# Patient Record
Sex: MALE | Race: WHITE | NOT HISPANIC OR LATINO
[De-identification: names, ages, dates, MRNs, and addresses within clinical notes are randomized per-mention and may not be internally consistent; named-entity substitution may affect disease eponyms.]

---

## 2020-11-30 PROBLEM — Z00.00 ENCOUNTER FOR PREVENTIVE HEALTH EXAMINATION: Status: ACTIVE | Noted: 2020-11-30

## 2020-12-07 ENCOUNTER — APPOINTMENT (OUTPATIENT)
Dept: COLORECTAL SURGERY | Facility: CLINIC | Age: 60
End: 2020-12-07
Payer: COMMERCIAL

## 2020-12-07 VITALS
WEIGHT: 181 LBS | HEIGHT: 68 IN | BODY MASS INDEX: 27.43 KG/M2 | HEART RATE: 108 BPM | DIASTOLIC BLOOD PRESSURE: 88 MMHG | SYSTOLIC BLOOD PRESSURE: 144 MMHG | TEMPERATURE: 97.9 F

## 2020-12-07 DIAGNOSIS — Z80.52 FAMILY HISTORY OF MALIGNANT NEOPLASM OF BLADDER: ICD-10-CM

## 2020-12-07 DIAGNOSIS — G47.30 SLEEP APNEA, UNSPECIFIED: ICD-10-CM

## 2020-12-07 DIAGNOSIS — Z72.89 OTHER PROBLEMS RELATED TO LIFESTYLE: ICD-10-CM

## 2020-12-07 DIAGNOSIS — E78.00 PURE HYPERCHOLESTEROLEMIA, UNSPECIFIED: ICD-10-CM

## 2020-12-07 DIAGNOSIS — Z82.49 FAMILY HISTORY OF ISCHEMIC HEART DISEASE AND OTHER DISEASES OF THE CIRCULATORY SYSTEM: ICD-10-CM

## 2020-12-07 DIAGNOSIS — Z83.3 FAMILY HISTORY OF DIABETES MELLITUS: ICD-10-CM

## 2020-12-07 DIAGNOSIS — M19.90 UNSPECIFIED OSTEOARTHRITIS, UNSPECIFIED SITE: ICD-10-CM

## 2020-12-07 DIAGNOSIS — Z80.3 FAMILY HISTORY OF MALIGNANT NEOPLASM OF BREAST: ICD-10-CM

## 2020-12-07 PROCEDURE — 99072 ADDL SUPL MATRL&STAF TM PHE: CPT

## 2020-12-07 PROCEDURE — 46600 DIAGNOSTIC ANOSCOPY SPX: CPT

## 2020-12-07 PROCEDURE — 99202 OFFICE O/P NEW SF 15 MIN: CPT | Mod: 25

## 2020-12-07 RX ORDER — LATANOPROST/PF 0.005 %
DROPS OPHTHALMIC (EYE)
Refills: 0 | Status: ACTIVE | COMMUNITY

## 2020-12-07 RX ORDER — CALCIUM POLYCARBOPHIL 625 MG
TABLET ORAL
Refills: 0 | Status: ACTIVE | COMMUNITY

## 2020-12-07 RX ORDER — MV-MIN/FOLIC/K1/LYCOPEN/LUTEIN 300-60 MCG
TABLET ORAL
Refills: 0 | Status: ACTIVE | COMMUNITY

## 2020-12-07 RX ORDER — OMEPRAZOLE 20 MG/1
20 CAPSULE, DELAYED RELEASE ORAL
Refills: 0 | Status: ACTIVE | COMMUNITY

## 2020-12-07 NOTE — PHYSICAL EXAM
[Excoriation] : no perianal excoriation [Normal] : was normal [None] : there was no rectal mass  [FreeTextEntry1] : A lighted anoscope was passed into the anal canal and the entire anal mucosal surface was inspected..  The findings revealed moderate internal hemorrhoids. No masses or lesions were identified.\par \par Large right posterior thrombosed internal hemorrhoid.

## 2020-12-07 NOTE — HISTORY OF PRESENT ILLNESS
[FreeTextEntry1] : 61 y/o M presents for evaluation of hemorrhoids, referred by Dr. Mark Vizcarra \par Patient underwent a hip replacement 7 week ago \par ~ 5 weeks ago he began to notice rectal pressure and possible prolapsing tissue \par D/c'd narcotics and prescription pain medications 4 weeks ago\par Also c/o left sided coccygeal pain for the last couple of weeks. Denies fall or injury to the area. Orthopedist evaluated patient and determined it was unrelated to recent surgery \par Previously evaluated at [facility] for rectal bleeding that occurred regardless of BM's. Denies noticing bleeding in the last 4 weeks\par Denies itching or imitation\par BH: Daily\par Denies constipation or diarrhea. Admits to continued straining with BM's that he attributes to needing to use a raised toilet seat but reports straining is improved\par Reports adequate dietary fiber and water intake. Drinking 8 glasses of water daily\par Taking FiberCon 3 tabs daily \par No use of stool softeners or laxatives. Initially used Colace PRN after surgery\par No sitz baths right now, unable to get in to tub with recent surgery\par Has tried Prep H ointment with mild to moderate improvement in symptoms\par Last colonoscopy completed 2018, no abnormal findings per patient\par Taking two ASA 81 mg 2/2 PE following surgery

## 2020-12-07 NOTE — ASSESSMENT
[FreeTextEntry1] : I reviewed with the patient I suspect that his anticoagulation has resulted in a propensity for an  internal hemorrhoid/thrombosis. I have counseled him regarding the need for continued efforts to avoid straining with bowel function. I anticipate spontaneous resolution of this process in 4-6 weeks. Advised to return if symptoms persist.

## 2021-03-11 ENCOUNTER — PREPPED CHART (OUTPATIENT)
Dept: URBAN - METROPOLITAN AREA CLINIC 94 | Facility: CLINIC | Age: 61
End: 2021-03-11

## 2021-12-22 ENCOUNTER — APPOINTMENT (OUTPATIENT)
Dept: GASTROENTEROLOGY | Facility: CLINIC | Age: 61
End: 2021-12-22
Payer: COMMERCIAL

## 2021-12-22 VITALS
OXYGEN SATURATION: 98 % | BODY MASS INDEX: 26.52 KG/M2 | HEIGHT: 68 IN | DIASTOLIC BLOOD PRESSURE: 90 MMHG | SYSTOLIC BLOOD PRESSURE: 156 MMHG | TEMPERATURE: 97.9 F | WEIGHT: 175 LBS | HEART RATE: 91 BPM

## 2021-12-22 DIAGNOSIS — Z99.89 OBSTRUCTIVE SLEEP APNEA (ADULT) (PEDIATRIC): ICD-10-CM

## 2021-12-22 DIAGNOSIS — Z86.39 PERSONAL HISTORY OF OTHER ENDOCRINE, NUTRITIONAL AND METABOLIC DISEASE: ICD-10-CM

## 2021-12-22 DIAGNOSIS — Z86.59 PERSONAL HISTORY OF OTHER MENTAL AND BEHAVIORAL DISORDERS: ICD-10-CM

## 2021-12-22 DIAGNOSIS — G47.33 OBSTRUCTIVE SLEEP APNEA (ADULT) (PEDIATRIC): ICD-10-CM

## 2021-12-22 PROCEDURE — 99203 OFFICE O/P NEW LOW 30 MIN: CPT

## 2021-12-22 RX ORDER — ASPIRIN 81 MG
81 TABLET, DELAYED RELEASE (ENTERIC COATED) ORAL
Refills: 0 | Status: DISCONTINUED | COMMUNITY
End: 2021-12-22

## 2021-12-22 NOTE — HISTORY OF PRESENT ILLNESS
[FreeTextEntry1] : 60 yo male with a pancreatic cyst here for evaluation, referred by Dr. Paresh Dodd.  MRCP showed a cyst in the uncinate of the pancreas measuring 2.1 cm.  Some tightness in his lower abdomen now, having multiple sludgy bm's -- also takes prune juice, simethicone and FiberCon.  Hx of anxiety, kept in check with meds that he is taking.  No N/V.  No constipation.  Since 2/1/21 drinking 1-2 16 oz green juices.  Rectal bleeding continues with blood in toilet and tissue -- believes that it is related to his hemorrhoids. Weight fluctuates in a 5 lb range, down 10-12 lbs from a year ago.  Little heartburn/reflux.  No hx of an EUS.  \par \par MRCP on 10/19/21 -- stable to slight increase in size of 2.1 x 1.4 x 1.3 cm uncinate process multiloculated cystic lesion in close proximity to the duct (on my review looks more in the head of the pancreas), demonstrates internal septa, compared to CT of 10/30/20\par \par Colonoscopy -- 8/26/2021 -- first degree hemorrhoids, diverticulosis, small sessile polyp of 2 mm s/p polypectomy (bx --> TA)\par \par EGD -- cannot remember details\par \par No famhx of stomach, colon or pancreatic cancer.  No IBD.\par \par Vaccinated - Moderna x 3\par \par , [employer].\par \par

## 2021-12-27 ENCOUNTER — NON-APPOINTMENT (OUTPATIENT)
Age: 61
End: 2021-12-27

## 2021-12-29 ENCOUNTER — TRANSCRIPTION ENCOUNTER (OUTPATIENT)
Age: 61
End: 2021-12-29

## 2021-12-29 ENCOUNTER — APPOINTMENT (OUTPATIENT)
Dept: COLORECTAL SURGERY | Facility: CLINIC | Age: 61
End: 2021-12-29
Payer: COMMERCIAL

## 2021-12-29 VITALS
TEMPERATURE: 97.5 F | HEIGHT: 68 IN | BODY MASS INDEX: 26.83 KG/M2 | HEART RATE: 87 BPM | SYSTOLIC BLOOD PRESSURE: 138 MMHG | DIASTOLIC BLOOD PRESSURE: 80 MMHG | WEIGHT: 177 LBS

## 2021-12-29 LAB
ANION GAP SERPL CALC-SCNC: 10 MMOL/L
BASOPHILS # BLD AUTO: 0.02 K/UL
BASOPHILS NFR BLD AUTO: 0.4 %
BUN SERPL-MCNC: 15 MG/DL
CALCIUM SERPL-MCNC: 9.5 MG/DL
CANCER AG19-9 SERPL-ACNC: 9 U/ML
CHLORIDE SERPL-SCNC: 102 MMOL/L
CO2 SERPL-SCNC: 29 MMOL/L
CREAT SERPL-MCNC: 1.04 MG/DL
EOSINOPHIL # BLD AUTO: 0.1 K/UL
EOSINOPHIL NFR BLD AUTO: 1.9 %
GLUCOSE SERPL-MCNC: 94 MG/DL
HCT VFR BLD CALC: 40.7 %
HGB BLD-MCNC: 13.1 G/DL
IMM GRANULOCYTES NFR BLD AUTO: 0.4 %
INR PPP: 0.95 RATIO
LYMPHOCYTES # BLD AUTO: 1.79 K/UL
LYMPHOCYTES NFR BLD AUTO: 33.5 %
MAN DIFF?: NORMAL
MCHC RBC-ENTMCNC: 28.7 PG
MCHC RBC-ENTMCNC: 32.2 GM/DL
MCV RBC AUTO: 89.3 FL
MONOCYTES # BLD AUTO: 0.46 K/UL
MONOCYTES NFR BLD AUTO: 8.6 %
NEUTROPHILS # BLD AUTO: 2.95 K/UL
NEUTROPHILS NFR BLD AUTO: 55.2 %
PLATELET # BLD AUTO: 254 K/UL
POTASSIUM SERPL-SCNC: 4.3 MMOL/L
PT BLD: 11.2 SEC
RBC # BLD: 4.56 M/UL
RBC # FLD: 14.4 %
SODIUM SERPL-SCNC: 141 MMOL/L
WBC # FLD AUTO: 5.34 K/UL

## 2021-12-29 PROCEDURE — 46221 LIGATION OF HEMORRHOID(S): CPT

## 2021-12-29 PROCEDURE — 99213 OFFICE O/P EST LOW 20 MIN: CPT | Mod: 25

## 2021-12-29 NOTE — HISTORY OF PRESENT ILLNESS
[FreeTextEntry1] : 62 yo M presents for f/u hemorrhoids\par \par Last seen 12/7/20, exam noted moderate internal hemorrhoids w/ large right posterior thrombosed internal hemorrhoid. Supportive measures discussed\par \par c/o rectal bleeding noted on TP w/ most BMs for the last few months\par Admits to hemorrhoidal swelling w/ BMs and likely prolapsing tissue that reduces on own\par c/o rectal burning or possible cut which is improved w/ using Prep H w/ improvement\par In the past, has been advised by Derm in January 2021 that anorectal tissue appeared irritated and has been prescribed likely desonide in the past. He hasn't used the ointment for several months\par \par BH: typically once daily w/ some straining. Admits to increased  BM frequency for the last few weeks\par Pt admits to being meticulous  and notes BRB on TP. He uses TP to clean himself\par Reports adequate dietary fiber and water intake\par Continues Fibercon 3 tabs, occasionally Benefiber and prune juice\par Last colonoscopy 8/26/21, hemorrhoids, diverticulosis and small sessile poyp of 2 mm, s/p polypectomy. Pathology c/w TA\par Of note, recently seen by SHARON Hay for uncinate pancreatic cyst on MRCP w/ plans for EUA/FNA, scheduled 3/7/22\par CA 19-9, CBC, CMP and PT/INR all WNL\par \par No longer takes ASA for the last several months

## 2021-12-29 NOTE — PHYSICAL EXAM
[Excoriation] : no perianal excoriation [Normal] : was normal [None] : there was no rectal mass  [FreeTextEntry1] : Medical assistant was present for the entire exam.\par \par Anoscopy was performed for evaluation of the patients rectal bleeding  history .\par The risks, benefits and alternatives were reviewed.\par \par A lighted anoscope was passed into the anal canal and the entire anal mucosal surface was inspected..  \par The findings revealed moderate/large internal hemorrhoids.\par No masses or lesions were identified.\par \par \par The risks and benefits of rubber band ligation were discussed with the patient including but not limited to bleeding, pain, infection, and the need for future procedures. The anoscope was placed and rubber band ligation was performed of the internal hemorrhoids–left lateral and right anterior quadrant with good result. The patient tolerated the procedure well. Appropriate postprocedure instructions were given to the patient.\par

## 2022-03-07 ENCOUNTER — OUTPATIENT (OUTPATIENT)
Dept: OUTPATIENT SERVICES | Facility: HOSPITAL | Age: 62
LOS: 1 days | Discharge: HOME | End: 2022-03-07
Payer: COMMERCIAL

## 2022-03-07 ENCOUNTER — APPOINTMENT (OUTPATIENT)
Dept: GASTROENTEROLOGY | Facility: HOSPITAL | Age: 62
End: 2022-03-07

## 2022-03-07 ENCOUNTER — RESULT REVIEW (OUTPATIENT)
Age: 62
End: 2022-03-07

## 2022-03-07 DIAGNOSIS — K29.70 GASTRITIS, UNSPECIFIED, W/OUT BLEEDING: ICD-10-CM

## 2022-03-07 PROCEDURE — 43242 EGD US FINE NEEDLE BX/ASPIR: CPT | Mod: GC

## 2022-03-07 PROCEDURE — 43239 EGD BIOPSY SINGLE/MULTIPLE: CPT | Mod: 59,GC

## 2022-03-07 PROCEDURE — 88305 TISSUE EXAM BY PATHOLOGIST: CPT | Mod: 26

## 2022-03-08 ENCOUNTER — RESULT REVIEW (OUTPATIENT)
Age: 62
End: 2022-03-08

## 2022-03-08 LAB — SURGICAL PATHOLOGY STUDY: SIGNIFICANT CHANGE UP

## 2022-03-08 PROCEDURE — 88305 TISSUE EXAM BY PATHOLOGIST: CPT

## 2022-03-08 PROCEDURE — 82378 CARCINOEMBRYONIC ANTIGEN: CPT

## 2022-03-08 PROCEDURE — 88173 CYTOPATH EVAL FNA REPORT: CPT | Mod: 26

## 2022-03-08 PROCEDURE — 88173 CYTOPATH EVAL FNA REPORT: CPT

## 2022-03-08 PROCEDURE — 88305 TISSUE EXAM BY PATHOLOGIST: CPT | Mod: 26

## 2022-03-08 PROCEDURE — 43242 EGD US FINE NEEDLE BX/ASPIR: CPT

## 2022-03-08 PROCEDURE — 43239 EGD BIOPSY SINGLE/MULTIPLE: CPT

## 2022-03-10 LAB
CEA FLD-MCNC: 130 NG/ML — SIGNIFICANT CHANGE UP
NON-GYNECOLOGICAL CYTOLOGY STUDY: SIGNIFICANT CHANGE UP
SPECIMEN SOURCE FLD: SIGNIFICANT CHANGE UP

## 2022-03-12 DIAGNOSIS — Z88.8 ALLERGY STATUS TO OTHER DRUGS, MEDICAMENTS AND BIOLOGICAL SUBSTANCES: ICD-10-CM

## 2022-03-12 DIAGNOSIS — K21.9 GASTRO-ESOPHAGEAL REFLUX DISEASE WITHOUT ESOPHAGITIS: ICD-10-CM

## 2022-03-12 DIAGNOSIS — G47.33 OBSTRUCTIVE SLEEP APNEA (ADULT) (PEDIATRIC): ICD-10-CM

## 2022-03-12 DIAGNOSIS — Z88.1 ALLERGY STATUS TO OTHER ANTIBIOTIC AGENTS STATUS: ICD-10-CM

## 2022-03-12 DIAGNOSIS — K29.50 UNSPECIFIED CHRONIC GASTRITIS WITHOUT BLEEDING: ICD-10-CM

## 2022-03-12 DIAGNOSIS — K86.2 CYST OF PANCREAS: ICD-10-CM

## 2022-03-12 DIAGNOSIS — M19.90 UNSPECIFIED OSTEOARTHRITIS, UNSPECIFIED SITE: ICD-10-CM

## 2022-03-12 DIAGNOSIS — Z99.89 DEPENDENCE ON OTHER ENABLING MACHINES AND DEVICES: ICD-10-CM

## 2022-04-28 PROBLEM — H40.053 OCULAR HYPERTENSION: Status: STABILIZING | Noted: 2022-04-28

## 2022-04-28 PROBLEM — H52.13 MYOPIA: Noted: 2022-04-28

## 2022-04-28 PROBLEM — Z96.1 PC IOL: Noted: 2022-04-28

## 2022-04-28 PROBLEM — H40.053 OCULAR HYPERTENSION: Noted: 2022-04-28

## 2022-06-03 ENCOUNTER — DIAGNOSTICS ONLY (OUTPATIENT)
Dept: URBAN - METROPOLITAN AREA CLINIC 94 | Facility: CLINIC | Age: 62
End: 2022-06-03

## 2022-06-03 DIAGNOSIS — H40.053: ICD-10-CM

## 2022-06-03 PROCEDURE — 92083 EXTENDED VISUAL FIELD XM: CPT

## 2022-06-03 PROCEDURE — 92133 CPTRZD OPH DX IMG PST SGM ON: CPT

## 2022-07-06 ENCOUNTER — APPOINTMENT (OUTPATIENT)
Dept: GASTROENTEROLOGY | Facility: CLINIC | Age: 62
End: 2022-07-06

## 2022-07-06 PROCEDURE — 99446 NTRPROF PH1/NTRNET/EHR 5-10: CPT

## 2022-09-13 ENCOUNTER — APPOINTMENT (OUTPATIENT)
Dept: MRI IMAGING | Facility: CLINIC | Age: 62
End: 2022-09-13

## 2022-09-13 ENCOUNTER — RESULT REVIEW (OUTPATIENT)
Age: 62
End: 2022-09-13

## 2022-09-13 ENCOUNTER — OUTPATIENT (OUTPATIENT)
Dept: OUTPATIENT SERVICES | Facility: HOSPITAL | Age: 62
LOS: 1 days | End: 2022-09-13

## 2022-09-13 PROCEDURE — 74183 MRI ABD W/O CNTR FLWD CNTR: CPT | Mod: 26

## 2022-09-19 DIAGNOSIS — Z01.812 ENCOUNTER FOR PREPROCEDURAL LABORATORY EXAMINATION: ICD-10-CM

## 2022-12-19 ENCOUNTER — APPOINTMENT (OUTPATIENT)
Dept: COLORECTAL SURGERY | Facility: CLINIC | Age: 62
End: 2022-12-19

## 2022-12-19 VITALS — HEART RATE: 80 BPM | DIASTOLIC BLOOD PRESSURE: 89 MMHG | SYSTOLIC BLOOD PRESSURE: 144 MMHG

## 2022-12-19 VITALS
SYSTOLIC BLOOD PRESSURE: 154 MMHG | WEIGHT: 177 LBS | HEIGHT: 68 IN | DIASTOLIC BLOOD PRESSURE: 113 MMHG | HEART RATE: 101 BPM | BODY MASS INDEX: 26.83 KG/M2 | TEMPERATURE: 97.7 F

## 2022-12-19 DIAGNOSIS — K64.8 OTHER HEMORRHOIDS: ICD-10-CM

## 2022-12-19 PROCEDURE — 99213 OFFICE O/P EST LOW 20 MIN: CPT | Mod: 25

## 2022-12-19 PROCEDURE — 46600 DIAGNOSTIC ANOSCOPY SPX: CPT

## 2022-12-19 RX ORDER — HYDROCORTISONE ACETATE 25 MG/1
25 SUPPOSITORY RECTAL
Qty: 14 | Refills: 1 | Status: ACTIVE | COMMUNITY
Start: 2022-12-19 | End: 1900-01-01

## 2022-12-19 RX ORDER — HYDROCORTISONE ACETATE AND PRAMOXINE HYDROCHLORIDE 25; 10 MG/G; MG/G
2.5-1 CREAM TOPICAL
Qty: 2 | Refills: 2 | Status: ACTIVE | COMMUNITY
Start: 2022-12-19 | End: 1900-01-01

## 2022-12-19 NOTE — PHYSICAL EXAM
[Excoriation] : no perianal excoriation [Normal] : was normal [None] : there was no rectal mass  [de-identified] : Mild right posterior lateral swollen internal and external thrombosis.  Nontender [FreeTextEntry1] : Medical assistant was present for the entire exam.\par \par Anoscopy was performed for evaluation of the patients rectal bleeding  history .\par The risks, benefits and alternatives were reviewed.\par \par A lighted anoscope was passed into the anal canal and the entire anal mucosal surface was inspected..  \par The findings revealed moderate/large internal hemorrhoids.\par No masses or lesions were identified.\par \par

## 2022-12-19 NOTE — HISTORY OF PRESENT ILLNESS
[FreeTextEntry1] : 61 y/o M presents for follow up evaluation of hemorrhoids \par \par Seen 12/7/20, exam noted moderate internal hemorrhoids w/ large right posterior thrombosed internal hemorrhoid. Supportive measures discussed\par \par Most recently seen in follow up 12/29/21, c/o rectal bleeding noted on TP w/ most BMS for the last few months. Admits hemorrhoidal swelling w/ BMs and likely prolapsing tissue reduces on its own. Rectal burning or possible cut which improved w/ Prep H. \par \par Exam including anoscopy revealed, no anal fissures, no perianal excoriation. Sphincter tone was normal, moderate large internal hemorrhoids. S/p rubber band ligation of prominent internal hemorrhoids in left lateral and right anterior quadrant. Pt tolerated procedure well. Post procedure instructions provided.  \par \par Pt tolerated rubber band procedure and had improvement in symptoms, until ~ 1-1.5 months ago experienced large, inflamed tissue that feels like "tearing" feeling and pressure like discomfort and occasional BRB on TP.  Admits to irritated swollen tissue at present. He is not using topical creams\par Admits BMs have been larger and admits to straining at times. He takes Fibercon and Benefiber daily, drinks 10-12 cups\par Denies ASA use. Took Aleve in the last week\par \par Last colonoscopy 8/26/21, hemorrhoids, diverticulosis and small sessile polyp of 2 mm, s/p polypectomy. Pathology c/w TA\par \par Of note, EGD-EUS performed by  3/7/22, revealed showed erosive gastritis in the antrum s/p biopsies, 15 x 14 mm septated cyst in the head of the pancreas with likely connection to the PD s/p FNA, no mural nodules noted (bx: gastric mucosa showing focal active chronic inflammation with focal erosion and reactive changes, no IM or dysplasia identified, no HP, cyto). Rare cluster of benign ductal cells and proteinaceous material. \par \par Pancreatic cyst fluid CEA(3/8/22) 130 ng/mL (normal )\par \par MRCP performed 9/30/22\par Impression: \par 2.4 cm side branch IPMN within the uncinate process of the pancreas. No worrisome signs of malignancy. Recommended correlation with prior imaging for stability. \par Diffuse gastric rugal thickening. Recommended further evaluation with endoscopy.  \par \par

## 2022-12-19 NOTE — ASSESSMENT
[FreeTextEntry1] : Advised trial of conservative medical therapy with topical hydrocortisone based treatment.  Continue high-fiber diet.  If symptoms persist would consider further efforts at rubber band ligation.

## 2022-12-27 ENCOUNTER — RESULT REVIEW (OUTPATIENT)
Age: 62
End: 2022-12-27

## 2022-12-27 ENCOUNTER — APPOINTMENT (OUTPATIENT)
Dept: GASTROENTEROLOGY | Facility: CLINIC | Age: 62
End: 2022-12-27

## 2022-12-27 PROCEDURE — 43239 EGD BIOPSY SINGLE/MULTIPLE: CPT

## 2023-05-03 ENCOUNTER — APPOINTMENT (OUTPATIENT)
Dept: GASTROENTEROLOGY | Facility: CLINIC | Age: 63
End: 2023-05-03

## 2023-09-13 ENCOUNTER — APPOINTMENT (OUTPATIENT)
Dept: GASTROENTEROLOGY | Facility: CLINIC | Age: 63
End: 2023-09-13
Payer: COMMERCIAL

## 2023-09-13 VITALS
DIASTOLIC BLOOD PRESSURE: 81 MMHG | TEMPERATURE: 97.9 F | HEART RATE: 89 BPM | RESPIRATION RATE: 16 BRPM | SYSTOLIC BLOOD PRESSURE: 146 MMHG | HEIGHT: 68 IN | WEIGHT: 178 LBS | OXYGEN SATURATION: 97 % | BODY MASS INDEX: 26.98 KG/M2

## 2023-09-13 DIAGNOSIS — K63.5 POLYP OF COLON: ICD-10-CM

## 2023-09-13 DIAGNOSIS — K86.2 CYST OF PANCREAS: ICD-10-CM

## 2023-09-13 PROCEDURE — 99214 OFFICE O/P EST MOD 30 MIN: CPT

## 2023-09-13 RX ORDER — OMEPRAZOLE 40 MG/1
40 CAPSULE, DELAYED RELEASE ORAL
Qty: 30 | Refills: 0 | Status: DISCONTINUED | COMMUNITY
Start: 2022-03-07 | End: 2023-09-13

## 2023-09-13 RX ORDER — AMITRIPTYLINE HYDROCHLORIDE 75 MG/1
TABLET, FILM COATED ORAL
Refills: 0 | Status: DISCONTINUED | COMMUNITY
End: 2023-09-13

## 2023-09-13 RX ORDER — CIPROFLOXACIN HYDROCHLORIDE 500 MG/1
500 TABLET, FILM COATED ORAL DAILY
Qty: 3 | Refills: 0 | Status: DISCONTINUED | COMMUNITY
Start: 2022-03-07 | End: 2023-09-13

## 2023-09-13 RX ORDER — ESCITALOPRAM OXALATE 10 MG/1
10 TABLET, FILM COATED ORAL
Refills: 0 | Status: ACTIVE | COMMUNITY

## 2023-09-13 RX ORDER — ROSUVASTATIN CALCIUM 10 MG/1
10 TABLET, FILM COATED ORAL
Refills: 0 | Status: ACTIVE | COMMUNITY

## 2023-09-13 RX ORDER — DESVENLAFAXINE 50 MG/1
50 TABLET, EXTENDED RELEASE ORAL
Qty: 90 | Refills: 0 | Status: DISCONTINUED | COMMUNITY
Start: 2020-06-22 | End: 2023-09-13

## 2023-09-13 RX ORDER — SIMVASTATIN 20 MG/1
20 TABLET, FILM COATED ORAL
Refills: 0 | Status: DISCONTINUED | COMMUNITY
End: 2023-09-13

## 2023-09-28 ENCOUNTER — APPOINTMENT (OUTPATIENT)
Dept: MRI IMAGING | Facility: CLINIC | Age: 63
End: 2023-09-28
Payer: COMMERCIAL

## 2023-09-28 ENCOUNTER — OUTPATIENT (OUTPATIENT)
Dept: OUTPATIENT SERVICES | Facility: HOSPITAL | Age: 63
LOS: 1 days | End: 2023-09-28

## 2023-09-28 PROCEDURE — 74183 MRI ABD W/O CNTR FLWD CNTR: CPT | Mod: 26

## 2023-10-05 ENCOUNTER — NON-APPOINTMENT (OUTPATIENT)
Age: 63
End: 2023-10-05

## 2024-10-14 ENCOUNTER — APPOINTMENT (OUTPATIENT)
Dept: MRI IMAGING | Facility: CLINIC | Age: 64
End: 2024-10-14
Payer: COMMERCIAL

## 2024-10-14 ENCOUNTER — OUTPATIENT (OUTPATIENT)
Dept: OUTPATIENT SERVICES | Facility: HOSPITAL | Age: 64
LOS: 1 days | End: 2024-10-14

## 2024-10-14 PROCEDURE — 74183 MRI ABD W/O CNTR FLWD CNTR: CPT | Mod: 26

## 2025-01-22 ENCOUNTER — APPOINTMENT (OUTPATIENT)
Dept: GASTROENTEROLOGY | Facility: CLINIC | Age: 65
End: 2025-01-22
Payer: COMMERCIAL

## 2025-01-22 ENCOUNTER — NON-APPOINTMENT (OUTPATIENT)
Age: 65
End: 2025-01-22

## 2025-01-22 DIAGNOSIS — R10.33 PERIUMBILICAL PAIN: ICD-10-CM

## 2025-01-22 DIAGNOSIS — R14.0 ABDOMINAL DISTENSION (GASEOUS): ICD-10-CM

## 2025-01-22 PROCEDURE — 99214 OFFICE O/P EST MOD 30 MIN: CPT

## 2025-02-06 ENCOUNTER — RESULT REVIEW (OUTPATIENT)
Age: 65
End: 2025-02-06

## 2025-02-12 ENCOUNTER — NON-APPOINTMENT (OUTPATIENT)
Age: 65
End: 2025-02-12

## 2025-02-12 DIAGNOSIS — M72.2 PLANTAR FASCIAL FIBROMATOSIS: ICD-10-CM

## 2025-02-12 DIAGNOSIS — Z86.018 PERSONAL HISTORY OF OTHER BENIGN NEOPLASM: ICD-10-CM

## 2025-02-12 RX ORDER — AMITRIPTYLINE HCL 10 MG
10 TABLET ORAL
Refills: 0 | Status: ACTIVE | COMMUNITY

## 2025-02-12 RX ORDER — SIMVASTATIN 20 MG/1
20 TABLET, FILM COATED ORAL
Refills: 0 | Status: ACTIVE | COMMUNITY

## 2025-02-12 RX ORDER — DESVENLAFAXINE SUCCINATE 50 MG/1
50 TABLET, EXTENDED RELEASE ORAL
Refills: 0 | Status: ACTIVE | COMMUNITY

## 2025-02-14 ENCOUNTER — OUTPATIENT (OUTPATIENT)
Dept: OUTPATIENT SERVICES | Facility: HOSPITAL | Age: 65
LOS: 1 days | End: 2025-02-14

## 2025-02-14 ENCOUNTER — APPOINTMENT (OUTPATIENT)
Dept: CT IMAGING | Facility: CLINIC | Age: 65
End: 2025-02-14
Payer: COMMERCIAL

## 2025-02-14 DIAGNOSIS — K59.00 CONSTIPATION, UNSPECIFIED: ICD-10-CM

## 2025-02-14 PROCEDURE — 74177 CT ABD & PELVIS W/CONTRAST: CPT | Mod: 26

## 2025-02-14 RX ORDER — POLYETHYLENE GLYCOL 3350 AND ELECTROLYTES WITH LEMON FLAVOR 236; 22.74; 6.74; 5.86; 2.97 G/4L; G/4L; G/4L; G/4L; G/4L
236 POWDER, FOR SOLUTION ORAL
Qty: 1 | Refills: 0 | Status: ACTIVE | COMMUNITY
Start: 2025-02-14 | End: 1900-01-01

## (undated) DEVICE — NDL ACQUIRE EUS FNB 22G